# Patient Record
Sex: MALE | Race: WHITE | NOT HISPANIC OR LATINO | Employment: OTHER | URBAN - METROPOLITAN AREA
[De-identification: names, ages, dates, MRNs, and addresses within clinical notes are randomized per-mention and may not be internally consistent; named-entity substitution may affect disease eponyms.]

---

## 2022-03-16 ENCOUNTER — APPOINTMENT (OUTPATIENT)
Dept: RADIOLOGY | Facility: MEDICAL CENTER | Age: 62
End: 2022-03-16
Payer: COMMERCIAL

## 2022-03-16 ENCOUNTER — OFFICE VISIT (OUTPATIENT)
Dept: URGENT CARE | Facility: MEDICAL CENTER | Age: 62
End: 2022-03-16
Payer: COMMERCIAL

## 2022-03-16 VITALS
OXYGEN SATURATION: 95 % | SYSTOLIC BLOOD PRESSURE: 160 MMHG | TEMPERATURE: 97.9 F | BODY MASS INDEX: 30.83 KG/M2 | HEART RATE: 80 BPM | WEIGHT: 260 LBS | RESPIRATION RATE: 20 BRPM | DIASTOLIC BLOOD PRESSURE: 82 MMHG

## 2022-03-16 DIAGNOSIS — S63.91XA HAND SPRAIN, RIGHT, INITIAL ENCOUNTER: Primary | ICD-10-CM

## 2022-03-16 DIAGNOSIS — S63.91XA HAND SPRAIN, RIGHT, INITIAL ENCOUNTER: ICD-10-CM

## 2022-03-16 PROCEDURE — S9088 SERVICES PROVIDED IN URGENT: HCPCS | Performed by: PHYSICIAN ASSISTANT

## 2022-03-16 PROCEDURE — 99213 OFFICE O/P EST LOW 20 MIN: CPT | Performed by: PHYSICIAN ASSISTANT

## 2022-03-16 PROCEDURE — 73130 X-RAY EXAM OF HAND: CPT

## 2022-03-16 RX ORDER — NAPROXEN 500 MG/1
500 TABLET ORAL 2 TIMES DAILY WITH MEALS
Qty: 30 TABLET | Refills: 0 | Status: SHIPPED | OUTPATIENT
Start: 2022-03-16

## 2022-03-16 NOTE — PATIENT INSTRUCTIONS
1  Ice and elevate the right hand and wrist 3-4 times daily for 20-30 minutes for the next 3-4 days  Then convert to heat in the same regimen until symptoms improve  2  Wear the wrist brace as directed  3  Follow-up with orthopedics in 7-10 days for any persistent symptoms

## 2022-03-16 NOTE — PROGRESS NOTES
[facility]        NAME: Zoya Guzman is a Tho Su 89 y o  male  : 1960    MRN: [id number]  DATE: 2022  TIME: 12:07 PM    Assessment and Plan   Hand sprain, right, initial encounter Faisal Mendoza  1  Hand sprain, right, initial encounter  XR hand 3+ vw right    naproxen (Naprosyn) 500 mg tablet         Patient Instructions     1  Ice and elevate the right hand and wrist 3-4 times daily for 20-30 minutes for the next 3-4 days  Then convert to heat in the same regimen until symptoms improve  2  Wear the wrist brace as directed  3  Follow-up with orthopedics in 7-10 days for any persistent symptoms  Chief Complaint     Chief Complaint   Patient presents with    Wrist Pain     R wrist pain since saturday; patient states the snowblower jammed into right hand          History of Present Illness       66-year-old male patient with a 1 day history of right hand pain  He states that he was snow blowing up a hill and it stopped propelling and kayleigh his hand and wrist backwards  He had immediate pain which is worse today  He states that he initially had numbness to the fingertips which has resolved  Pain is severe with movement gripping  Review of Systems   Review of Systems   Constitutional: Negative for chills and fever  HENT: Negative for ear pain and sore throat  Eyes: Negative for pain and visual disturbance  Respiratory: Negative for cough and shortness of breath  Cardiovascular: Negative for chest pain and palpitations  Gastrointestinal: Negative for abdominal pain and vomiting  Genitourinary: Negative for dysuria and hematuria  Musculoskeletal: Positive for arthralgias  Negative for back pain  Skin: Negative for color change and rash  Neurological: Negative for seizures and syncope  All other systems reviewed and are negative          Current Medications       Current Outpatient Medications:     naproxen (Naprosyn) 500 mg tablet, Take 1 tablet (500 mg total) by mouth 2 (two) times a day with meals, Disp: 30 tablet, Rfl: 0    Current Allergies     Allergies as of 03/16/2022    (No Known Allergies)            The following portions of the patient's history were reviewed and updated as appropriate: allergies, current medications, past family history, past medical history, past social history, past surgical history and problem list      Past Medical History:   Diagnosis Date    Skull fracture ([address] )        Past Surgical History:   Procedure Laterality Date    KNEE SURGERY      SKULL FRACTURE ELEVATION      TOTAL HIP ARTHROPLASTY      bilateral       History reviewed  No pertinent family history  Medications have been verified  Objective   /82   Pulse 80   Temp 97 9 °F (36 6 °C)   Resp 20   Wt 118 kg (260 lb)   SpO2 95%   BMI 30 83 kg/m²        Physical Exam     Physical Exam  Vitals and nursing note reviewed  Constitutional:       Appearance: Normal appearance  HENT:      Head: Normocephalic  Nose: Nose normal       Mouth/Throat:      Mouth: Mucous membranes are dry  Pharynx: Oropharynx is clear  Eyes:      Conjunctiva/sclera: Conjunctivae normal       Pupils: Pupils are equal, round, and reactive to light  Cardiovascular:      Rate and Rhythm: Normal rate and regular rhythm  Pulses: Normal pulses  Pulmonary:      Effort: Pulmonary effort is normal       Breath sounds: Normal breath sounds  Musculoskeletal:      Cervical back: Normal range of motion and neck supple  Comments: Right hand tender dorsally with localized swelling and some redness noted  Decreased range of motion at all joints of all fingers  Although wrist is nontender wrist range of motion is affected  No gross deformities  No open wounds  Distal neurovascular exam is grossly intact  Skin:     General: Skin is warm and dry  Neurological:      Mental Status: He is alert     Psychiatric:         Mood and Affect: Mood normal  Behavior: Behavior normal        Preliminary reading of right hand x-ray:   Multiple old avulsion fractures of the carpal bones and ulnar styloid  No acute fracture seen  No acute dislocations seen  Moderate DJD noted